# Patient Record
Sex: FEMALE | Race: WHITE | Employment: FULL TIME | URBAN - METROPOLITAN AREA
[De-identification: names, ages, dates, MRNs, and addresses within clinical notes are randomized per-mention and may not be internally consistent; named-entity substitution may affect disease eponyms.]

---

## 2017-06-16 ENCOUNTER — HOSPITAL ENCOUNTER (OUTPATIENT)
Age: 37
Discharge: HOME | End: 2017-06-16
Payer: COMMERCIAL

## 2017-06-16 ENCOUNTER — APPOINTMENT (OUTPATIENT)
Dept: CT IMAGING | Age: 37
End: 2017-06-16
Attending: NURSE PRACTITIONER
Payer: COMMERCIAL

## 2017-06-16 VITALS
DIASTOLIC BLOOD PRESSURE: 70 MMHG | SYSTOLIC BLOOD PRESSURE: 115 MMHG | HEIGHT: 61 IN | TEMPERATURE: 98 F | WEIGHT: 125 LBS | RESPIRATION RATE: 18 BRPM | HEART RATE: 70 BPM | OXYGEN SATURATION: 100 % | BODY MASS INDEX: 23.6 KG/M2

## 2017-06-16 DIAGNOSIS — R13.10 DYSPHAGIA, UNSPECIFIED TYPE: Primary | ICD-10-CM

## 2017-06-16 DIAGNOSIS — K12.2 UVULITIS: ICD-10-CM

## 2017-06-16 PROCEDURE — 99204 OFFICE O/P NEW MOD 45 MIN: CPT

## 2017-06-16 PROCEDURE — 36415 COLL VENOUS BLD VENIPUNCTURE: CPT

## 2017-06-16 PROCEDURE — 80047 BASIC METABLC PNL IONIZED CA: CPT

## 2017-06-16 PROCEDURE — 85025 COMPLETE CBC W/AUTO DIFF WBC: CPT | Performed by: NURSE PRACTITIONER

## 2017-06-16 PROCEDURE — 70491 CT SOFT TISSUE NECK W/DYE: CPT | Performed by: NURSE PRACTITIONER

## 2017-06-16 PROCEDURE — 87430 STREP A AG IA: CPT | Performed by: NURSE PRACTITIONER

## 2017-06-16 PROCEDURE — 87081 CULTURE SCREEN ONLY: CPT | Performed by: NURSE PRACTITIONER

## 2017-06-16 RX ORDER — TOPIRAMATE 50 MG/1
50 TABLET, FILM COATED ORAL 2 TIMES DAILY
COMMUNITY

## 2017-06-16 RX ORDER — AMOXICILLIN 875 MG/1
875 TABLET, COATED ORAL 2 TIMES DAILY
Qty: 20 TABLET | Refills: 0 | Status: SHIPPED | OUTPATIENT
Start: 2017-06-16 | End: 2017-06-26

## 2017-06-16 RX ORDER — SUMATRIPTAN 6 MG/.5ML
6 INJECTION, SOLUTION SUBCUTANEOUS ONCE
COMMUNITY

## 2017-06-16 RX ORDER — DEXAMETHASONE SODIUM PHOSPHATE 4 MG/ML
10 VIAL (ML) INJECTION ONCE
Status: COMPLETED | OUTPATIENT
Start: 2017-06-16 | End: 2017-06-16

## 2017-06-16 NOTE — ED INITIAL ASSESSMENT (HPI)
Patient started with a sore throat yesterday. She has had her tonsils removed. She has visible swelling to her uvula more to the left side. Pain started on the right yesterday however.  Patient feels a fullness in her throat and is making snorting noises fr

## 2017-06-17 NOTE — ED PROVIDER NOTES
Patient Seen in: [address] [facility] - [place], [place]    History   Patient presents with:  Sore Throat    Stated Complaint: sore throat    The history is provided by the patient.      64-year-old female who presents to the immediate care with complaints of a s breath. Cardiovascular: Negative. Neurological: Negative for dizziness and headaches. Hematological: Negative. Psychiatric/Behavioral: Negative. All other systems reviewed and are negative.       Positive for stated complaint: sore throat  Oth are normal. Pupils are equal, round, and reactive to light. Right eye exhibits no discharge. Left eye exhibits no discharge. No scleral icterus. Neck: Normal range of motion. Neck supple.    Cardiovascular: Normal rate, regular rhythm and normal heart sangeeta visible swelling to uvula. Patient is making snorting noises when she breaths and states it is starting to feel like it is hard to swallow. CONTRAST USED:  50cc of Omnipaque 350  FINDINGS:  Views of the paranasal sinuses show no sinusitis disease.  The na (CEPACOL SORE THROAT MAX NUMB) 15-3.6 MG Mouth/Throat Lozenge  Place 1 lozenge inside cheek every 4 (four) hours as needed., Normal, Disp-84 lozenge, R-0

## 2017-07-05 ENCOUNTER — OFFICE VISIT (OUTPATIENT)
Dept: FAMILY MEDICINE CLINIC | Facility: CLINIC | Age: 37
End: 2017-07-05

## 2017-07-05 VITALS
HEIGHT: 61 IN | BODY MASS INDEX: 23.6 KG/M2 | SYSTOLIC BLOOD PRESSURE: 110 MMHG | WEIGHT: 125 LBS | DIASTOLIC BLOOD PRESSURE: 60 MMHG | TEMPERATURE: 98 F | HEART RATE: 86 BPM | OXYGEN SATURATION: 98 % | RESPIRATION RATE: 14 BRPM

## 2017-07-05 DIAGNOSIS — Z02.0 SCHOOL PHYSICAL EXAM: Primary | ICD-10-CM

## 2017-07-05 PROCEDURE — 99385 PREV VISIT NEW AGE 18-39: CPT | Performed by: PHYSICIAN ASSISTANT

## 2017-07-05 RX ORDER — BUTALBITAL, ACETAMINOPHEN AND CAFFEINE 50; 325; 40 MG/1; MG/1; MG/1
1 TABLET ORAL EVERY 4 HOURS PRN
COMMUNITY

## 2021-10-23 NOTE — PROGRESS NOTES
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 Hx of migraines that are controlled.     Body mass index is 23.62 kg/m² Candace crisis worker states \"we will wait on [facility] to respond\". This RN will notify candace with any updates.    arm, Patient Position: Sitting, Cuff Size: adult)   Pulse 86   Temp 97.9 °F (36.6 °C) (Oral)   Resp 14   Ht 61\"   Wt 125 lb   LMP 05/19/2017 (Within Days)   SpO2 98%   BMI 23.62 kg/m²   Body mass index is 23.62 kg/m².      GENERAL: well developed, well nou

## 2024-05-25 NOTE — LETTER
[facility] IN [place]  [address] [address]  Dept: [phone]  Dept Fax: [phone]      June 16, 2017    Patient: Florian Rousseau   Date of Visit: 6/16/2017       To Whom It May Concern:    Florian Rousseau was seen and treated i Alert and oriented to person, place and time

## 2024-10-11 NOTE — LETTER
PEDIATRIC PHYSICAL THERAPY EVALUATION  Type of Visit: Evaluation     Fall Risk Screen:  Are you concerned about your child s balance?: No  Does your child trip or fall more often than you would expect?: No  Is your child fearful of falling or hesitant during daily activities?: No  Is your child receiving physical therapy services?: No      Subjective     Presenting condition or subjective complaint:  encopresis  Caregiver reported concerns: encopresis without awareness  Date of onset: 09/24/24 (date of order, long standing bowel and bladder issues)   Prior therapy history for the same diagnosis, illness or injury:    yes, PT 2 years ago.    Prior Level of Function  Transfers: Independent  Ambulation: Independent  ADL: Independent    Living Environment  Social support:    family  Others who live in the home:      splits time between parents homes. Siblings present in each home    Current ADL devices:  Toileting Equipment:  stool for feet when using toilet  Goals for therapy:  resolve encopresis  Developmental History Milestones: no concerns    Pain assessment: Pain denied     Objective      Additional History  Status of problem:  staying the same over the last few month  Activity avoidance or difficulty performing activities because of this problem:    no  Tests or surgeries and results the child has had for this problem:  abdominal Xray  Medications or treatments (past or present) the child has had for this problem:  laxatives and enemas    Bladder Habits  Urge to urinate:  yes  Number of urine voids per day:  4 to 5  Urinary symptoms experienced:  none    Urine leaks:     no leaks day or night  Child feels empty after urination:  yes  Child wears pull ups or pads:  no    Bowel Habits  Child has a bowel urge:  yes  Number of bowel movements per day:   2  Consistency of stool:    usually soft, but formed  Encopresis: yes; frequency: 4 to 5 times per week at 1 parent's house and 1 time per week at other parent's  State [facility] [employer] of Child Health Examination       Student's Name  Ani Navarro Birth [place] [employer]TER[employer]OOF OF IMMUNITY   1.Clinical diagnosis (measles, mumps, hepatits B) is allowed when verified by physician & supported with lab confirmation. Attach copy of lab result.        *MEASLES ([place])  MO/DA/YR        * MUMPS MO/DA/YR       HEPATITIS house; amount of leakage: generally smears; activity when leaking: generally with vigorous exercise; awareness of leakage: not always    Child feels empty after passing a bowel movement:  usually  Child wears pullups or pads:  no  Child complains of pain:  no      Dietary Habits   Cups of liquid per day (cup = 8 oz):  4  Drinks with caffeine:  not usually  Current consumed bladder irritants:  nothing significant  Changes to diet    No. Tries for a lot of fruit and vegetables each day    Discussed reason for referral regarding pelvic health needs and external/internal pelvic floor muscle examination with patient/guardian.  Opportunity provided to ask questions and verbal consent for assessment and intervention was given.    Functional pelvic floor exam  Integumentary: no issues  Abdominal Assessment:  LEXA presence: No  Breathing Symmetry: WNL  Joint Hypermobility no    Perineal body observation / Pelvic floor resting posture: WNL  Pelvic floor muscle contraction: perineal elevation  Pelvic floor muscle relaxation: yes - full relaxation visible  Descent of Perineum with bearing down: perineal descent  Pelvic Floor muscle coordination when asked to simulate voiding:  some incoordination when asked to contract and relax in a row    Biofeedback  Not available for use today. Will offer at another session if exercise alone does not appear to be sufficient to help with bowel leakage    [place] Pediatric Bowel and Bladder Dysfunction Scale  Bowel and bladder symptoms identified:     Bowel symptoms: yes     Daytime urinary symptoms: no     Infrequent urination: no     Lower urinary tract symptoms: no      Nocturnal enuresis: no     Urinary holding: no  Quality of life impact (level of  bother ): medium problem     [place] Pediatric Bladder and Bowel Dysfunction Questionnaire, [facility], Departments of Urology, [place]. David Ruiz     Assessment & Plan   CLINICAL  IMPRESSIONS  Medical Diagnosis: Nocturnal enuresis (N39.44)    Incontinence of feces, unspecified fecal incontinence type (R15.9)    Treatment Diagnosis: occasional encopresis     Impression/Assessment:   Patient is a 10 year old male who was referred for concerns regarding encopresis.  Patient presents with mild pelvic floor muscle incoordination and decreased awarenss which impacts bowel continence. He also most often experiences encopresis around times of aggressive exercise. He will benefit from PFM exercise as well as toilet sits around the times of aggressive exercise.      Clinical Decision Making (Complexity):  Clinical Presentation: Stable/Uncomplicated  Clinical Presentation Rationale: based on medical and personal factors listed in PT evaluation  Clinical Decision Making (Complexity): Low complexity    Plan of Care  Treatment Interventions:  Interventions: Therapeutic Activity, Therapeutic Exercise    Long Term Goals     PT Goal 1  Goal Identifier: Symptom managment  Goal Description: RICHAR will be able to manage bowel symptoms with home managment program  Target Date: 01/08/25  PT Goal 2  Goal Identifier: encopresis  Goal Description: RICHAR will report no episodes of encopresis for a period of 1 month to show improved bowel control  Target Date: 01/08/25  PT Goal 3  Goal Identifier: Pelvic floor muscle control  Goal Description: RICHAR will demonstrate ability to fully contract his PFM and hold for 10 seconds with out compensations and fully relax his PFM to show improved strength and control of PFM to improve bowel control  Target Date: 01/08/25        Frequency of Treatment: 1 to 2 visits after evaluation  Duration of Treatment: 2 months    Recommended Referrals to Other Professionals:  none at this time    Education Assessment:    Learner/Method: Patient;Caregiver;Listening;Demonstration;No Barriers to Learning    Risks and benefits of evaluation/treatment have been explained.   Patient/Family/caregiver agrees  Yes   No  Hospitalizations? When? What for? Yes   No    Blood disorders? Hemophilia, Sickle Cell, Other? Explain. Yes   No  Surgery? (List all.)  When? What for? Yes   No    Diabetes? Yes   No  Serious injury or illness?    Yes   No    Head with Plan of Care.     Evaluation Time:     PT Danyelle Low Complexity Minutes (44529): 10       Signing Clinician: Judith Coker PT         {YES_NO:585}   Lead Risk Questionnaire  Req'd for children 6 months thru 6 yrs enrolled in licensed or public school operated day care, ,  nursery school and/or  (blood test req’d if resides in [place] or high risk zip)   Questionnaire Ad Currently Prescribed Asthma Medication:            Quick-relief  medication (e.g. Short Acting Beta Antagonist): {NO:830::\"No\"}          Controller medication (e.g. inhaled corticosteroid):   {NO:830::\"No\"} Other   NEEDS/MODIFICATIONS required in t

## (undated) NOTE — ED AVS SNAPSHOT
THE [facility] [address] [facility] [address] [address]    Phone:  [phone]    Fax:  [address]   MRN: [id number]    Department:  THE [facility] in [place]   Date of Visit:  6/16/2017           Diagn Place 1 lozenge inside cheek every 4 (four) hours as needed.             Where to Get Your Medications      These medications were sent to [address], [address], [address], [address]     Phone: self-assessment the day after your visit. You may also receive a call from our patient liason soon after your visit. Also, some patients receive a detailed feedback survey mailed to them a week after the visit.   If you receive this, we would really apprec Esperanza Espinoza [facility] [phone] [address] [facility] [address] ([address]) [facility] [address] [facility].  (Cat through the neck soft tissues during administration of nonionic contrast.  Dose reduction techniques were used.  Dose information is transmitted to the ACR ([facility] of   Radiology) Radha [address] ([address]) which includes the Dose Ind Support Staff. Remember, MyChart is NOT to be used for urgent needs. For medical emergencies, dial 911.